# Patient Record
Sex: FEMALE | Race: WHITE | NOT HISPANIC OR LATINO | Employment: OTHER | URBAN - METROPOLITAN AREA
[De-identification: names, ages, dates, MRNs, and addresses within clinical notes are randomized per-mention and may not be internally consistent; named-entity substitution may affect disease eponyms.]

---

## 2018-07-16 ENCOUNTER — OFFICE VISIT (OUTPATIENT)
Dept: OBSTETRICS AND GYNECOLOGY | Facility: CLINIC | Age: 63
End: 2018-07-16
Payer: MEDICARE

## 2018-07-16 ENCOUNTER — APPOINTMENT (OUTPATIENT)
Dept: RADIOLOGY | Facility: OTHER | Age: 63
End: 2018-07-16
Attending: OBSTETRICS & GYNECOLOGY
Payer: MEDICARE

## 2018-07-16 VITALS
BODY MASS INDEX: 23.53 KG/M2 | WEIGHT: 127.88 LBS | HEIGHT: 62 IN | DIASTOLIC BLOOD PRESSURE: 72 MMHG | SYSTOLIC BLOOD PRESSURE: 124 MMHG

## 2018-07-16 DIAGNOSIS — Z01.419 ENCOUNTER FOR GYNECOLOGICAL EXAMINATION: ICD-10-CM

## 2018-07-16 DIAGNOSIS — Z12.31 BREAST CANCER SCREENING BY MAMMOGRAM: Primary | ICD-10-CM

## 2018-07-16 DIAGNOSIS — Z12.31 BREAST CANCER SCREENING BY MAMMOGRAM: ICD-10-CM

## 2018-07-16 PROCEDURE — 99999 PR PBB SHADOW E&M-EST. PATIENT-LVL III: CPT | Mod: PBBFAC,,, | Performed by: OBSTETRICS & GYNECOLOGY

## 2018-07-16 PROCEDURE — 77063 BREAST TOMOSYNTHESIS BI: CPT | Mod: 26,,, | Performed by: RADIOLOGY

## 2018-07-16 PROCEDURE — G0101 CA SCREEN;PELVIC/BREAST EXAM: HCPCS | Mod: S$GLB,,, | Performed by: OBSTETRICS & GYNECOLOGY

## 2018-07-16 PROCEDURE — 77067 SCR MAMMO BI INCL CAD: CPT | Mod: TC,PN

## 2018-07-16 PROCEDURE — 77067 SCR MAMMO BI INCL CAD: CPT | Mod: 26,,, | Performed by: RADIOLOGY

## 2018-07-16 RX ORDER — TRETINOIN 1 MG/G
CREAM TOPICAL NIGHTLY
Qty: 20 G | Refills: 3 | Status: SHIPPED | OUTPATIENT
Start: 2018-07-16 | End: 2019-07-22

## 2018-07-16 NOTE — PROGRESS NOTES
Chief Complaint Well woman exam:  Well Woman (Annual Exam, last pap  pap & hpv negative. Last mammo 2016 birads 1 )      Christa Catherine is a 63 y.o. female  presents for a well woman exam.    She is established.  No issues, problems, or complaints.   No c/o No VB No Abd pain     LMP: No LMP recorded. Patient is postmenopausal.  Contraception: Menopausal yes  Cycles:None    Last pap: 2016 normal hpv neg  Last MM Birads 1  Last colonoscopy:  2018 Normal repeat 10 years      Current Outpatient Prescriptions:     TAZORAC 0.05 % Crea cream, , Disp: , Rfl:     tretinoin (RETIN-A) 0.1 % cream, Apply topically every evening., Disp: 20 g, Rfl: 3    Past Medical History:   Diagnosis Date    Fibroids        Past Surgical History:   Procedure Laterality Date    COLONOSCOPY      per pt./normal    DILATION AND CURETTAGE OF UTERUS      missed AB    tonsilectomy         OB History    Para Term  AB Living   1       1     SAB TAB Ectopic Multiple Live Births   1       0      # Outcome Date GA Lbr Keegan/2nd Weight Sex Delivery Anes PTL Lv   1 1995 6w0d    SAB   DEC          Family History   Problem Relation Age of Onset    Lung cancer Father     Heart failure Mother     Diabetes Brother     Alcohol abuse Brother     Cervical cancer Sister     Breast cancer Neg Hx        Social History   Substance Use Topics    Smoking status: Never Smoker    Smokeless tobacco: Never Used    Alcohol use No         ROS:    GENERAL: Denies weight gain or weight loss. Feeling well overall.   SKIN: Denies rash or lesions.   HEENT: Denies headaches, or vision changes.   CARDIOVASCULAR: Denies palpitations or left sided chest pain.   RESPIRATORY: Denies shortness of breath or dyspnea on exertion.  BREASTS: Denies pain, lumps, or nipple discharge.   ABDOMEN: Denies abdominal pain, constipation, diarrhea, nausea, vomiting, change in appetite or rectal bleeding.   URINARY: Denies frequency, dysuria,  "hematuria.  NEUROLOGIC: Denies syncope or weakness.   PSYCHIATRIC: Denies depression, anxiety or mood swings.    Physical Exam:  /72   Ht 5' 2" (1.575 m)   Wt 58 kg (127 lb 13.9 oz)   BMI 23.39 kg/m²     APPEARANCE: Well nourished, well developed, in no acute distress.  AFFECT: WNL, alert and oriented x 3  SKIN: No acne or hirsutism  BREASTS: Symmetrical, no skin changes.                    No nipple discharge. No palpable masses bilaterally  ABDOMEN: soft Non tender Non distended No masses  PELVIC:   Normal external genitalia without lesions.   Normal urethral meatus. No signif cystocele or rectocele.  Vagina atrophic without lesions or discharge.    Cervix atrophic, without lesions, discharge or tenderness.    Bimanual exam shows uterus to be normal size, regular, mobile and nontender.  Adnexa without masses or tenderness.    EXTREMITIES: No edema.    ASSESSMENT AND PLAN  1. Breast cancer screening by mammogram  Mammo Digital Screening Bilat with Tomosynthesis CAD   2. Encounter for gynecological examination         Christa was seen today for well woman.    Diagnoses and all orders for this visit:    Breast cancer screening by mammogram  -     Mammo Digital Screening Bilat with Tomosynthesis CAD; Future    Encounter for gynecological examination    Other orders  -     tretinoin (RETIN-A) 0.1 % cream; Apply topically every evening.            Patient was counseled today on A.C.S. Pap guidelines and recommendations for yearly pelvic exams, mammograms and monthly self breast exams; to see her PCP for other health maintenance.     Patient encouraged to register for portal and results will be sent via portal.    Follow-up in about 1 year (around 7/16/2019).         Health Maintenance   Topic Date Due    Hepatitis C Screening  1955    Lipid Panel  1955    TETANUS VACCINE  05/20/1971    Colonoscopy  05/20/1971    DEXA SCAN  05/20/1993    Zoster Vaccine  05/20/2013    Mammogram  06/08/2017    " Pneumococcal (65+) (1 of 2 - PCV13) 05/20/2018    Influenza Vaccine  08/01/2018

## 2019-07-22 ENCOUNTER — OFFICE VISIT (OUTPATIENT)
Dept: OBSTETRICS AND GYNECOLOGY | Facility: CLINIC | Age: 64
End: 2019-07-22
Payer: MEDICARE

## 2019-07-22 ENCOUNTER — APPOINTMENT (OUTPATIENT)
Dept: RADIOLOGY | Facility: OTHER | Age: 64
End: 2019-07-22
Attending: OBSTETRICS & GYNECOLOGY
Payer: MEDICARE

## 2019-07-22 VITALS
WEIGHT: 130.06 LBS | DIASTOLIC BLOOD PRESSURE: 80 MMHG | SYSTOLIC BLOOD PRESSURE: 128 MMHG | HEIGHT: 62 IN | BODY MASS INDEX: 23.93 KG/M2

## 2019-07-22 DIAGNOSIS — Z01.419 ENCOUNTER FOR GYNECOLOGICAL EXAMINATION: Primary | ICD-10-CM

## 2019-07-22 DIAGNOSIS — Z12.4 ENCOUNTER FOR PAPANICOLAOU SMEAR FOR CERVICAL CANCER SCREENING: ICD-10-CM

## 2019-07-22 DIAGNOSIS — Z11.51 ENCOUNTER FOR SCREENING FOR HUMAN PAPILLOMAVIRUS (HPV): ICD-10-CM

## 2019-07-22 DIAGNOSIS — Z12.31 BREAST CANCER SCREENING BY MAMMOGRAM: ICD-10-CM

## 2019-07-22 PROCEDURE — 77063 BREAST TOMOSYNTHESIS BI: CPT | Mod: 26,,, | Performed by: RADIOLOGY

## 2019-07-22 PROCEDURE — 77067 SCR MAMMO BI INCL CAD: CPT | Mod: 26,,, | Performed by: RADIOLOGY

## 2019-07-22 PROCEDURE — 77067 MAMMO DIGITAL SCREENING BILAT WITH TOMOSYNTHESIS_CAD: ICD-10-PCS | Mod: 26,,, | Performed by: RADIOLOGY

## 2019-07-22 PROCEDURE — 88175 CYTOPATH C/V AUTO FLUID REDO: CPT

## 2019-07-22 PROCEDURE — G0101 CA SCREEN;PELVIC/BREAST EXAM: HCPCS | Mod: S$GLB,,, | Performed by: OBSTETRICS & GYNECOLOGY

## 2019-07-22 PROCEDURE — 77067 SCR MAMMO BI INCL CAD: CPT | Mod: TC,PN

## 2019-07-22 PROCEDURE — 77063 MAMMO DIGITAL SCREENING BILAT WITH TOMOSYNTHESIS_CAD: ICD-10-PCS | Mod: 26,,, | Performed by: RADIOLOGY

## 2019-07-22 PROCEDURE — G0101 PR CA SCREEN;PELVIC/BREAST EXAM: ICD-10-PCS | Mod: S$GLB,,, | Performed by: OBSTETRICS & GYNECOLOGY

## 2019-07-22 PROCEDURE — 87624 HPV HI-RISK TYP POOLED RSLT: CPT

## 2019-07-22 PROCEDURE — 99999 PR PBB SHADOW E&M-EST. PATIENT-LVL III: ICD-10-PCS | Mod: PBBFAC,,, | Performed by: OBSTETRICS & GYNECOLOGY

## 2019-07-22 PROCEDURE — 99999 PR PBB SHADOW E&M-EST. PATIENT-LVL III: CPT | Mod: PBBFAC,,, | Performed by: OBSTETRICS & GYNECOLOGY

## 2019-07-22 NOTE — PROGRESS NOTES
Chief Complaint: well woman exam  Well Woman (Annual Exam, Last pap/hpv  negative, Last mammo  birads 1 [facility])      Christa Catherine is a 64 y.o. female  presents for a well woman exam.    She is established.  No complaints.  No vaginal bleeding. no abdominal pain.      LMP: No LMP recorded. Patient is postmenopausal..    Last pap: 2016  normal HPV negative  Last MMG:  2018 [facility] BI-RADS 1  Last colonoscopy:  5 years ago normal repeat in 10 years        Medication List with Changes/Refills   Current Medications    MULTIVIT WITH MINERALS/LUTEIN (MULTIVITAMIN 50 PLUS ORAL)    multivitamin   Discontinued Medications    TAZORAC 0.05 % CREA CREAM        TRETINOIN (RETIN-A) 0.1 % CREAM    Apply topically every evening.       Past Medical History:   Diagnosis Date    Fibroids        Past Surgical History:   Procedure Laterality Date    COLONOSCOPY      per pt./normal    DILATION AND CURETTAGE OF UTERUS      missed AB    tonsilectomy         OB History    Para Term  AB Living   1   0   1     SAB TAB Ectopic Multiple Live Births   1       0      # Outcome Date GA Lbr Keegan/2nd Weight Sex Delivery Anes PTL Lv   1 SAB  6w0d    SAB   DEC       Family History   Problem Relation Age of Onset    Lung cancer Father     Heart failure Mother     Diabetes Brother     Alcohol abuse Brother     Cervical cancer Sister     Breast cancer Neg Hx        Social History     Tobacco Use    Smoking status: Never Smoker    Smokeless tobacco: Never Used   Substance Use Topics    Alcohol use: No    Drug use: No         ROS:  GENERAL: Denies weight gain or weight loss. Feeling well overall.   SKIN: Denies rash or lesions.   HEENT: Denies headaches, or vision changes.   CARDIOVASCULAR: Denies palpitations or left sided chest pain.   RESPIRATORY: Denies shortness of breath or dyspnea on exertion.  BREASTS: Denies pain, lumps, or nipple discharge.   ABDOMEN: Denies abdominal pain, constipation,  "diarrhea, nausea, vomiting, change in appetite or rectal bleeding.   URINARY: Denies frequency, dysuria, hematuria.  NEUROLOGIC: Denies syncope or weakness.   PSYCHIATRIC: Denies depression, anxiety or mood swings.    Physical Exam:  /80   Ht 5' 2" (1.575 m)   Wt 59 kg (130 lb 1.1 oz)   BMI 23.79 kg/m²     APPEARANCE: Well nourished, well developed, in no acute distress.  AFFECT: WNL, alert and oriented x 3  SKIN: No acne or hirsutism  BREASTS: Symmetrical, no skin changes.                     No nipple discharge. No palpable masses bilaterally  NODES: No inguinal nor axillary LAD  ABDOMEN: soft Non tender Non distended No masses  PELVIC:   Normal external genitalia without lesions.  Normal hair distribution.   Adequate perineal body, normal urethral meatus. No signif cystocele or rectocele.  Vagina moist and well rugated without lesions or discharge.    Cervix pink, without lesions, discharge or tenderness.     PAP performed   Bimanual exam shows uterus to be normal size, regular, mobile and nontender.  Adnexa without masses or tenderness.    EXTREMITIES: No edema.        ASSESSMENT AND PLAN    Christa was seen today for well woman.    Diagnoses and all orders for this visit:    Encounter for gynecological examination    Breast cancer screening by mammogram  -     Mammo Digital Screening Bilat w/ Luis E; Future    Encounter for screening for human papillomavirus (HPV)  -     HPV High Risk Genotypes, PCR    Encounter for Papanicolaou smear for cervical cancer screening  -     Liquid-based pap smear, screening          Follow up in about 1 year (around 7/22/2020).    Patient was counseled today on A.C.S. Pap guidelines and recommendations for yearly pelvic exams, mammograms and monthly self breast exams; to see her PCP for other health maintenance.     Patient encouraged to register for portal and results will be sent via portal.       Health Maintenance   Topic Date Due    Hepatitis C Screening  1955    " Lipid Panel  1955    TETANUS VACCINE  05/20/1971    Colonoscopy  05/20/1971    DEXA SCAN  05/20/1993    Pneumococcal Vaccine (65+ Low/Medium Risk) (1 of 2 - PCV13) 05/20/2018    Mammogram  07/16/2019    Influenza Vaccine  08/01/2019

## 2019-07-24 ENCOUNTER — TELEPHONE (OUTPATIENT)
Dept: RADIOLOGY | Facility: OTHER | Age: 64
End: 2019-07-24

## 2019-07-24 NOTE — TELEPHONE ENCOUNTER
Notified patient of abnormal mammo and need for additional imaging. Attempted to schedule but patient needs to see which location will best suit her current schedule. States she will call back to make appointment.

## 2019-07-26 LAB
HPV HR 12 DNA CVX QL NAA+PROBE: NEGATIVE
HPV16 AG SPEC QL: NEGATIVE
HPV18 DNA SPEC QL NAA+PROBE: NEGATIVE

## 2019-07-29 ENCOUNTER — TELEPHONE (OUTPATIENT)
Dept: OBSTETRICS AND GYNECOLOGY | Facility: CLINIC | Age: 64
End: 2019-07-29

## 2019-07-29 DIAGNOSIS — R92.8 ABNORMAL MAMMOGRAM OF LEFT BREAST: Primary | ICD-10-CM

## 2019-07-29 NOTE — TELEPHONE ENCOUNTER
I called pt. Explained MMG report in detail. Answered all questions. She would like to go to [facility] in [place] for add'l views and u/s    Please send orders first thing in the morning so she can schedule    Per pt  Fax-- [phone]  Phone--[phone]

## 2019-07-29 NOTE — TELEPHONE ENCOUNTER
Pt had screening mammo done at [facility] now needs add'l images & possible u/s.   They have her previous films since she has been going to [facility] since 2016.  She however does not like [facility] & would like to go have her add'l images done at [facility].   I explained to her that she should stay at the same facility & the process will take longer if she switches, to get films,etc.   Please call pt & go over mammo results & advise.

## 2019-07-29 NOTE — TELEPHONE ENCOUNTER
Pt. Wants to go to [facility] for her add'l images instead of [facility]. I explained that the process is going to take longer since they do not have her films, etc.   Pt. Wants to review her abnormal results today.  is out of the office until Wed.

## 2019-07-29 NOTE — TELEPHONE ENCOUNTER
Pt called to speak with Janine or  about her results. She would like to see if she could get a call back today because she wants to try to make the f/u appt for lani.

## 2019-08-06 NOTE — TELEPHONE ENCOUNTER
Spoke to patient.  Has appointment at [facility] next week.  She will call us after she has her diagnostic mammogram/ additional images and ultrasound.    We will get the report after she calls.

## 2019-08-16 ENCOUNTER — HOSPITAL ENCOUNTER (OUTPATIENT)
Dept: RADIOLOGY | Facility: HOSPITAL | Age: 64
Discharge: HOME | End: 2019-08-16
Attending: OBSTETRICS & GYNECOLOGY
Payer: MEDICARE

## 2019-08-16 DIAGNOSIS — R92.8 ABNORMAL MAMMOGRAM: ICD-10-CM

## 2019-08-16 PROCEDURE — 76642 ULTRASOUND BREAST LIMITED: CPT | Mod: 26,LT,, | Performed by: RADIOLOGY

## 2019-08-16 PROCEDURE — 77065 DX MAMMO INCL CAD UNI: CPT | Mod: 26,LT,, | Performed by: RADIOLOGY

## 2019-08-16 PROCEDURE — 77065 DX MAMMO INCL CAD UNI: CPT | Mod: TC,PO,LT

## 2019-08-16 PROCEDURE — 76642 US BREAST LEFT LIMITED: ICD-10-PCS | Mod: 26,LT,, | Performed by: RADIOLOGY

## 2019-08-16 PROCEDURE — 77065 MAMMO DIGITAL DIAGNOSTIC LEFT WITH TOMOSYNTHESIS_CAD: ICD-10-PCS | Mod: 26,LT,, | Performed by: RADIOLOGY

## 2019-08-16 PROCEDURE — 77061 BREAST TOMOSYNTHESIS UNI: CPT | Mod: TC,PO,LT

## 2019-08-16 PROCEDURE — 76642 ULTRASOUND BREAST LIMITED: CPT | Mod: TC,PO,LT

## 2019-08-16 PROCEDURE — 77061 MAMMO DIGITAL DIAGNOSTIC LEFT WITH TOMOSYNTHESIS_CAD: ICD-10-PCS | Mod: 26,LT,, | Performed by: RADIOLOGY

## 2019-08-16 PROCEDURE — 77061 BREAST TOMOSYNTHESIS UNI: CPT | Mod: 26,LT,, | Performed by: RADIOLOGY

## 2019-08-19 NOTE — PROGRESS NOTES
Results given to patient.  Patient aware that she has to go back in 6 months for a follow-up ultrasound.    Janine please put order in for left breast ultrasound for 6 months.  Diagnosis breast cyst left

## 2019-08-27 DIAGNOSIS — R92.8 ABNORMAL MAMMOGRAM OF LEFT BREAST: Primary | ICD-10-CM

## 2019-08-27 DIAGNOSIS — N60.02 BREAST CYST, LEFT: ICD-10-CM

## 2019-12-09 ENCOUNTER — OFFICE VISIT (OUTPATIENT)
Dept: PODIATRY | Facility: CLINIC | Age: 64
End: 2019-12-09
Payer: MEDICARE

## 2019-12-09 VITALS
WEIGHT: 125 LBS | BODY MASS INDEX: 23 KG/M2 | SYSTOLIC BLOOD PRESSURE: 104 MMHG | HEIGHT: 62 IN | DIASTOLIC BLOOD PRESSURE: 64 MMHG | HEART RATE: 61 BPM

## 2019-12-09 DIAGNOSIS — L60.0 INGROWN NAIL: Primary | ICD-10-CM

## 2019-12-09 PROCEDURE — 99203 PR OFFICE/OUTPT VISIT, NEW, LEVL III, 30-44 MIN: ICD-10-PCS | Mod: 25,S$GLB,, | Performed by: PODIATRIST

## 2019-12-09 PROCEDURE — 1101F PT FALLS ASSESS-DOCD LE1/YR: CPT | Mod: S$GLB,,, | Performed by: PODIATRIST

## 2019-12-09 PROCEDURE — 1125F AMNT PAIN NOTED PAIN PRSNT: CPT | Mod: S$GLB,,, | Performed by: PODIATRIST

## 2019-12-09 PROCEDURE — 11750 EXCISION NAIL&NAIL MATRIX: CPT | Mod: T5,S$GLB,, | Performed by: PODIATRIST

## 2019-12-09 PROCEDURE — 1125F PR PAIN SEVERITY QUANTIFIED, PAIN PRESENT: ICD-10-PCS | Mod: S$GLB,,, | Performed by: PODIATRIST

## 2019-12-09 PROCEDURE — 99203 OFFICE O/P NEW LOW 30 MIN: CPT | Mod: 25,S$GLB,, | Performed by: PODIATRIST

## 2019-12-09 PROCEDURE — 1159F MED LIST DOCD IN RCRD: CPT | Mod: S$GLB,,, | Performed by: PODIATRIST

## 2019-12-09 PROCEDURE — 1101F PR PT FALLS ASSESS DOC 0-1 FALLS W/OUT INJ PAST YR: ICD-10-PCS | Mod: S$GLB,,, | Performed by: PODIATRIST

## 2019-12-09 PROCEDURE — 1159F PR MEDICATION LIST DOCUMENTED IN MEDICAL RECORD: ICD-10-PCS | Mod: S$GLB,,, | Performed by: PODIATRIST

## 2019-12-09 PROCEDURE — 11750 NAIL REMOVAL: ICD-10-PCS | Mod: T5,S$GLB,, | Performed by: PODIATRIST

## 2019-12-09 RX ORDER — CEPHALEXIN 500 MG/1
500 CAPSULE ORAL EVERY 12 HOURS
Qty: 20 CAPSULE | Refills: 0 | Status: SHIPPED | OUTPATIENT
Start: 2019-12-09 | End: 2019-12-19

## 2019-12-09 NOTE — PATIENT INSTRUCTIONS

## 2019-12-26 ENCOUNTER — OFFICE VISIT (OUTPATIENT)
Dept: PODIATRY | Facility: CLINIC | Age: 64
End: 2019-12-26
Payer: MEDICARE

## 2019-12-26 DIAGNOSIS — L60.0 INGROWN NAIL: Primary | ICD-10-CM

## 2019-12-26 PROCEDURE — 1159F PR MEDICATION LIST DOCUMENTED IN MEDICAL RECORD: ICD-10-PCS | Mod: S$GLB,,, | Performed by: PODIATRIST

## 2019-12-26 PROCEDURE — 1159F MED LIST DOCD IN RCRD: CPT | Mod: S$GLB,,, | Performed by: PODIATRIST

## 2019-12-26 PROCEDURE — 99213 PR OFFICE/OUTPT VISIT, EST, LEVL III, 20-29 MIN: ICD-10-PCS | Mod: S$GLB,,, | Performed by: PODIATRIST

## 2019-12-26 PROCEDURE — 1101F PR PT FALLS ASSESS DOC 0-1 FALLS W/OUT INJ PAST YR: ICD-10-PCS | Mod: S$GLB,,, | Performed by: PODIATRIST

## 2019-12-26 PROCEDURE — 1101F PT FALLS ASSESS-DOCD LE1/YR: CPT | Mod: S$GLB,,, | Performed by: PODIATRIST

## 2019-12-26 PROCEDURE — 99213 OFFICE O/P EST LOW 20 MIN: CPT | Mod: S$GLB,,, | Performed by: PODIATRIST

## 2019-12-26 NOTE — PROGRESS NOTES
[address] [facility] [address]  [place] [address]  Phone: [phone]   [phone]    Patient's PCP:Primary Doctor No  Referring Provider: No ref. provider found    Subjective:      Chief Complaint:: Follow-up (P&A right first medial border)    NADINE Catherine is a 64 y.o. female who presents for follow P&A right great toe medial border. Symptoms have improved. Treatment to date have included dressing changes and neosporin. Patients rates pain 0/10 on pain scale.      There were no vitals filed for this visit.  Shoe Size: 7    Past Surgical History:   Procedure Laterality Date    COLONOSCOPY  2000    per pt./normal    DILATION AND CURETTAGE OF UTERUS  1995    missed AB    tonsilectomy       Past Medical History:   Diagnosis Date    Fibroids      Family History   Problem Relation Age of Onset    Lung cancer Father     Heart failure Mother     Diabetes Brother     Alcohol abuse Brother     Cervical cancer Sister     Ovarian cancer Other     Breast cancer Neg Hx         Social History:   Marital Status:   Alcohol History:  reports that she does not drink alcohol.  Tobacco History:  reports that she has never smoked. She has never used smokeless tobacco.  Drug History:  reports that she does not use drugs.    Review of patient's allergies indicates:   Allergen Reactions    Codeine Itching       Current Outpatient Medications   Medication Sig Dispense Refill    multivit with minerals/lutein (MULTIVITAMIN 50 PLUS ORAL) multivitamin       No current facility-administered medications for this visit.        Review of Systems      Objective:        Physical Exam:   Foot Exam    General  General Appearance: appears stated age and healthy   Orientation: alert and oriented to person, place, and time   Affect: appropriate   Gait: unimpaired           Physical Exam   Musculoskeletal:        Feet:            Imaging:            Assessment:       1. Ingrown nail - Right Foot      Plan:   Ingrown nail -  Right Foot     Patient do daily dressings with Neosporin and tube gauze until the toe is dry no longer draining.  She is discharged today will return as needed.  No follow-ups on file.    Procedures - None    Counseling:     I provided patient education verbally regarding:   Patient diagnosis, treatment options, as well as alternatives, risks, and benefits.     This note was created using Dragon voice recognition software that occasionally misinterpreted phrases or words.

## 2020-02-10 ENCOUNTER — TELEPHONE (OUTPATIENT)
Dept: OBSTETRICS AND GYNECOLOGY | Facility: CLINIC | Age: 65
End: 2020-02-10

## 2020-02-10 NOTE — TELEPHONE ENCOUNTER
Spoke with pt, [facility].   Orders are entered already

## 2020-02-10 NOTE — TELEPHONE ENCOUNTER
Patient wants to know if she needs a diagnostic mammo follow up done (8-19) because she can do one today at 3:30 in [place],.... Please call patient back today

## 2020-03-24 DIAGNOSIS — R92.8 ABNORMAL MAMMOGRAM OF LEFT BREAST: Primary | ICD-10-CM

## 2020-07-21 ENCOUNTER — TELEPHONE (OUTPATIENT)
Dept: OBSTETRICS AND GYNECOLOGY | Facility: CLINIC | Age: 65
End: 2020-07-21

## 2020-07-21 DIAGNOSIS — N60.02 BREAST CYST, LEFT: ICD-10-CM

## 2020-07-21 DIAGNOSIS — R92.8 ABNORMAL MAMMOGRAM OF LEFT BREAST: ICD-10-CM

## 2020-07-21 DIAGNOSIS — Z12.31 BREAST CANCER SCREENING BY MAMMOGRAM: Primary | ICD-10-CM

## 2020-07-21 NOTE — TELEPHONE ENCOUNTER
Larisa Saunders routed conversation to Jimmie CARPENTER Staff 47 minutes ago (1:16 PM)      Christa Catherine [phone]  Larisa Saunders 49 minutes ago (1:14 PM)     Dr. Samuel pt called saying that she needs mmg orders to be sent to [facility]. Please advise. Thank you.     Incoming call

## 2021-03-02 ENCOUNTER — OFFICE VISIT (OUTPATIENT)
Dept: OBSTETRICS AND GYNECOLOGY | Facility: CLINIC | Age: 66
End: 2021-03-02
Attending: OBSTETRICS & GYNECOLOGY
Payer: MEDICARE

## 2021-03-02 VITALS
BODY MASS INDEX: 23.53 KG/M2 | SYSTOLIC BLOOD PRESSURE: 128 MMHG | DIASTOLIC BLOOD PRESSURE: 78 MMHG | WEIGHT: 127.88 LBS | HEIGHT: 62 IN

## 2021-03-02 DIAGNOSIS — Z01.419 ENCOUNTER FOR GYNECOLOGICAL EXAMINATION: Primary | ICD-10-CM

## 2021-03-02 DIAGNOSIS — Z12.31 BREAST CANCER SCREENING BY MAMMOGRAM: ICD-10-CM

## 2021-03-02 PROCEDURE — 1101F PR PT FALLS ASSESS DOC 0-1 FALLS W/OUT INJ PAST YR: ICD-10-PCS | Mod: CPTII,S$GLB,, | Performed by: OBSTETRICS & GYNECOLOGY

## 2021-03-02 PROCEDURE — 1101F PT FALLS ASSESS-DOCD LE1/YR: CPT | Mod: CPTII,S$GLB,, | Performed by: OBSTETRICS & GYNECOLOGY

## 2021-03-02 PROCEDURE — 3008F BODY MASS INDEX DOCD: CPT | Mod: CPTII,S$GLB,, | Performed by: OBSTETRICS & GYNECOLOGY

## 2021-03-02 PROCEDURE — 99999 PR PBB SHADOW E&M-EST. PATIENT-LVL III: CPT | Mod: PBBFAC,,, | Performed by: OBSTETRICS & GYNECOLOGY

## 2021-03-02 PROCEDURE — 3008F PR BODY MASS INDEX (BMI) DOCUMENTED: ICD-10-PCS | Mod: CPTII,S$GLB,, | Performed by: OBSTETRICS & GYNECOLOGY

## 2021-03-02 PROCEDURE — G0101 PR CA SCREEN;PELVIC/BREAST EXAM: ICD-10-PCS | Mod: S$GLB,,, | Performed by: OBSTETRICS & GYNECOLOGY

## 2021-03-02 PROCEDURE — 3288F FALL RISK ASSESSMENT DOCD: CPT | Mod: CPTII,S$GLB,, | Performed by: OBSTETRICS & GYNECOLOGY

## 2021-03-02 PROCEDURE — 99999 PR PBB SHADOW E&M-EST. PATIENT-LVL III: ICD-10-PCS | Mod: PBBFAC,,, | Performed by: OBSTETRICS & GYNECOLOGY

## 2021-03-02 PROCEDURE — G0101 CA SCREEN;PELVIC/BREAST EXAM: HCPCS | Mod: S$GLB,,, | Performed by: OBSTETRICS & GYNECOLOGY

## 2021-03-02 PROCEDURE — 3288F PR FALLS RISK ASSESSMENT DOCUMENTED: ICD-10-PCS | Mod: CPTII,S$GLB,, | Performed by: OBSTETRICS & GYNECOLOGY

## 2021-03-02 PROCEDURE — 1126F AMNT PAIN NOTED NONE PRSNT: CPT | Mod: S$GLB,,, | Performed by: OBSTETRICS & GYNECOLOGY

## 2021-03-02 PROCEDURE — 1126F PR PAIN SEVERITY QUANTIFIED, NO PAIN PRESENT: ICD-10-PCS | Mod: S$GLB,,, | Performed by: OBSTETRICS & GYNECOLOGY

## 2021-08-02 ENCOUNTER — APPOINTMENT (OUTPATIENT)
Dept: RADIOLOGY | Facility: OTHER | Age: 66
End: 2021-08-02
Attending: OBSTETRICS & GYNECOLOGY
Payer: MEDICARE

## 2021-08-02 VITALS — BODY MASS INDEX: 23.53 KG/M2 | HEIGHT: 62 IN | WEIGHT: 127.88 LBS

## 2021-08-02 DIAGNOSIS — Z12.31 BREAST CANCER SCREENING BY MAMMOGRAM: ICD-10-CM

## 2021-08-02 PROCEDURE — 77067 SCR MAMMO BI INCL CAD: CPT | Mod: 26,,, | Performed by: RADIOLOGY

## 2021-08-02 PROCEDURE — 77063 BREAST TOMOSYNTHESIS BI: CPT | Mod: 26,,, | Performed by: RADIOLOGY

## 2021-08-02 PROCEDURE — 77067 SCR MAMMO BI INCL CAD: CPT | Mod: TC,PN

## 2021-08-02 PROCEDURE — 77063 MAMMO DIGITAL SCREENING BILAT WITH TOMO: ICD-10-PCS | Mod: 26,,, | Performed by: RADIOLOGY

## 2021-08-02 PROCEDURE — 77067 MAMMO DIGITAL SCREENING BILAT WITH TOMO: ICD-10-PCS | Mod: 26,,, | Performed by: RADIOLOGY

## 2024-07-12 ENCOUNTER — TELEPHONE (OUTPATIENT)
Dept: RADIOLOGY | Facility: HOSPITAL | Age: 69
End: 2024-07-12

## 2024-08-19 ENCOUNTER — TELEPHONE (OUTPATIENT)
Dept: OBSTETRICS AND GYNECOLOGY | Facility: CLINIC | Age: 69
End: 2024-08-19

## 2024-08-19 NOTE — TELEPHONE ENCOUNTER
----- Message from Chelsea Ro sent at 8/19/2024  8:37 AM CDT -----  Regarding: pt advice  Contact: [phone]  Pt called to get details on diagnostic  mammogram and has questions. Pls call

## 2024-09-09 ENCOUNTER — HOSPITAL ENCOUNTER (OUTPATIENT)
Dept: RADIOLOGY | Facility: HOSPITAL | Age: 69
Discharge: HOME | End: 2024-09-09
Attending: OBSTETRICS & GYNECOLOGY
Payer: MEDICARE

## 2024-09-09 VITALS — HEIGHT: 62 IN | BODY MASS INDEX: 23.37 KG/M2 | WEIGHT: 127 LBS

## 2024-09-09 DIAGNOSIS — R92.8 ABNORMAL MAMMOGRAM OF LEFT BREAST: ICD-10-CM

## 2024-09-09 PROCEDURE — 77061 BREAST TOMOSYNTHESIS UNI: CPT | Mod: TC,LT

## 2024-09-09 PROCEDURE — 77061 BREAST TOMOSYNTHESIS UNI: CPT | Mod: 26,LT,, | Performed by: RADIOLOGY

## 2024-09-09 PROCEDURE — 77065 DX MAMMO INCL CAD UNI: CPT | Mod: 26,LT,, | Performed by: RADIOLOGY

## 2024-09-10 ENCOUNTER — TELEPHONE (OUTPATIENT)
Dept: RADIOLOGY | Facility: HOSPITAL | Age: 69
End: 2024-09-10

## 2024-09-10 NOTE — TELEPHONE ENCOUNTER
Pt advised per Dr. Samuel.  Says she will get scheduled for biopsy.      Informed pt she hasn't seen Dr. Samuel in over 3 years. Suggested scheduling.  Requesting a Friday. Informed pt Dr. Samuel is out of the office on Fridays. Pt will call back after biopsy to schedule appt.

## 2024-09-10 NOTE — TELEPHONE ENCOUNTER
Spoke with patient in reference to scheduling her breast biopsy. Patient did not schedule, per the patient she has not deiced if she is going to have the breast biopsy and will call back to schedule. Verified patient has my contact information.  Mailed patient her breast imaging results certified.    Awake/Cooperative/Alert

## 2024-09-10 NOTE — TELEPHONE ENCOUNTER
----- Message from Mariana Franco sent at 9/10/2024 11:38 AM CDT -----  Regarding: missed call  Contact: @ [phone]  Pt is returning a missed call from Vasquez .. in regards to making a appointment ...Please call and adv @ [phone]

## 2024-09-10 NOTE — TELEPHONE ENCOUNTER
Please reach out to Ms Christa,  She declined bx 6 mo and and is hesitant per Dell about scheduling bx  Please let her know I am at a 4 dayconference and unable to call but I saw her results and wanted to reach out to her and let her know that I agree - she needs a bx  I highly suggest that she get scheduled to that we will have clarity of what is going on :)  Biopsy is not bad and the radiologists are WONDERFUL

## 2024-09-10 NOTE — PROGRESS NOTES
Breast RN and my RN have reached out to pt   Aware of results  6 mo ago declined bx  Considering bx but hesitant  Declined to schedule at present  My RN also calling pt